# Patient Record
Sex: FEMALE | Race: WHITE | NOT HISPANIC OR LATINO | Employment: FULL TIME | URBAN - METROPOLITAN AREA
[De-identification: names, ages, dates, MRNs, and addresses within clinical notes are randomized per-mention and may not be internally consistent; named-entity substitution may affect disease eponyms.]

---

## 2018-01-04 ENCOUNTER — OFFICE VISIT (OUTPATIENT)
Dept: OBSTETRICS AND GYNECOLOGY | Facility: CLINIC | Age: 43
End: 2018-01-04
Payer: COMMERCIAL

## 2018-01-04 VITALS
BODY MASS INDEX: 31.01 KG/M2 | HEIGHT: 67 IN | SYSTOLIC BLOOD PRESSURE: 188 MMHG | DIASTOLIC BLOOD PRESSURE: 109 MMHG | HEART RATE: 94 BPM | WEIGHT: 197.56 LBS

## 2018-01-04 DIAGNOSIS — N63.21 BREAST LUMP ON LEFT SIDE AT 1 O'CLOCK POSITION: Primary | ICD-10-CM

## 2018-01-04 DIAGNOSIS — Z01.419 WELL WOMAN EXAM WITH ROUTINE GYNECOLOGICAL EXAM: ICD-10-CM

## 2018-01-04 PROCEDURE — 99386 PREV VISIT NEW AGE 40-64: CPT | Mod: S$GLB,,, | Performed by: OBSTETRICS & GYNECOLOGY

## 2018-01-04 PROCEDURE — 99999 PR PBB SHADOW E&M-NEW PATIENT-LVL III: CPT | Mod: PBBFAC,,, | Performed by: OBSTETRICS & GYNECOLOGY

## 2018-01-04 RX ORDER — MELOXICAM 15 MG/1
15 TABLET ORAL
COMMUNITY
Start: 2017-05-31

## 2018-01-04 NOTE — PROGRESS NOTES
CHIEF COMPLAINT   Gynecologic Exam  Chief Complaint   Patient presents with    Well Woman     annual        HISTORY OF PRESENT ILLNESS  Patient presents for annual exam. The patient has a c/o f 2-4wks of L upper breast 'dull, aggravating pain'. Is not able to feel a mass.   Appetite wt bm are all stable.     No LMP recorded. Patient has had a hysterectomy. benign indications.  Ovaries removed.  No ERT out of choice due to fam hx brca    Reports no bowel movement irregularities from baseline, bloating, or weight loss.  ERT:   None        Health Maintenance   Topic Date Due    Lipid Panel  1973    TETANUS VACCINE  10/07/1991    Mammogram  11/17/2016    Influenza Vaccine  08/01/2017       HISTORY  Patient Active Problem List   Diagnosis    Menopause present, declines hormone replacement therapy       Past Medical History:   Diagnosis Date    ADHD (attention deficit hyperactivity disorder)     Mental disorder     bipolar, depression       Past Surgical History:   Procedure Laterality Date    BACK SURGERY  02/28/2016    HYSTERECTOMY      OOPHORECTOMY         Family History   Problem Relation Age of Onset    Colon cancer Father     Breast cancer Maternal Aunt     Asthma Mother     Ovarian cancer Neg Hx        Social History     Social History    Marital status: Single     Spouse name: N/A    Number of children: N/A    Years of education: N/A     Social History Main Topics    Smoking status: Never Smoker    Smokeless tobacco: None    Alcohol use No    Drug use: No    Sexual activity: No     Other Topics Concern    None     Social History Narrative    None       Current Outpatient Prescriptions   Medication Sig Dispense Refill    alprazolam (XANAX) 1 MG tablet Take 1 mg by mouth 2 (two) times daily.      AMPHETAMINE SALT COMBO 20 MG tablet   0    lamotrigine (LAMICTAL) 100 MG tablet       meloxicam (MOBIC) 15 MG tablet Take 15 mg by mouth as needed.      vortioxetine 10 mg Tab Take 10 mg  by mouth.       No current facility-administered medications for this visit.        Review of patient's allergies indicates:   Allergen Reactions    Hydrocodone Rash    Codeine Rash         PHYSICAL EXAM     Vitals:    01/04/18 1026   BP: (!) 188/109   Pulse: 94   repeat bp was requested but nurse did not due to it due to miscommunication.  Spoke w pt just now - she is at work but for lunch will  Ck bp at a drug store and email back the value.   She has no c/o h/a vision change CP.    PAIN SCALE: 0/10 None    ROS:  GENERAL: No fever, chills, fatigability or weight loss.  ABDOMEN: Appetite fine. No weight loss. Denies diarrhea, abdominal pain, hematemesis or blood in stool.  No change in bowel movement pattern.  URINARY: No flank pain, dysuria or hematuria.  REPRODUCTIVE: No abnormal vaginal bleeding.  BREASTS: Breasts symmetric, nontender and no lumps detected.    PE:   APPEARANCE: Well nourished, well developed, in no acute distress.  NECK: Neck symmetric without masses or thyromegaly.    NODES: No inguinal lymph node enlargement.  ABDOMEN: Soft. No tenderness or masses. No hepatosplenomegaly. No hernias.  BREASTS: Symmetrical, no skin changes or visible lesions. No  nipple discharge or adenopathy bilaterally.  L breast at 1:00 where the pt feels her dull pain is an area of cystic thickening about 3x2cm that is tender, half way between the nipple and the axilla. It is mobile but poorly circumscribed.     PELVIC:   VULVA: No lesions. Normal female genitalia.  URETHRAL MEATUS: Normal size and location, no lesions, no prolapse.  URETHRA: No masses, tenderness, prolapse or scarring.  PELVIC: Normal external female genitalia without lesions. Normal hair distribution. Adequate perineal body, normal urethral meatus. Vagina moist and well rugated without lesions or discharge. No significant cystocele or rectocele. Uterus and cervix surgically absent. Bimanual exam revealed no masses, tenderness or abnormality.    ANUS,  PERINEUM: no masses, external hemorrhoids noted.      DIAGNOSIS:   1. Annual exam  2. Elevated bp  3. Breast mass    PLAN:    rec home bp cuff.; reviewed stroke signs and reasons to go to ER immed.   Mammogram diagnostic w u/s        MEDICATIONS PRESCRIBED: calcium vitamin D weight bearing exercise       COUNSELING:  Patient was counseled today on A.C.S. Pap guidelines and recommendations for yearly pelvic exams, mammograms and monthly self breast exams; to see her PCP for other health maintenance.        FOLLOW-UP: pt to email or call back bp repeat and set up f/u w PCP Rehana.        Dr. Lares,    After talking with you yesterday afternoon, I checked my blood pressure again.  It was still pretty high last night and again this morning so I went to see Dr. Fontaine .  When he checked, it was 140/90.   I did have the flu last week and was taking OTC medication and my anxiety has been higher than usual so Dr. Fontaine thinks it may be associated with that.   He also looked back thru my history and since my blood pressure has always stayed around 120/80, we're just going to monitor it for now.      I just wanted to let you know that I did follow up with him about it.     Kind regards,     Evelyn Morales

## 2018-01-05 ENCOUNTER — PATIENT MESSAGE (OUTPATIENT)
Dept: OBSTETRICS AND GYNECOLOGY | Facility: CLINIC | Age: 43
End: 2018-01-05

## 2018-01-08 ENCOUNTER — HOSPITAL ENCOUNTER (OUTPATIENT)
Dept: RADIOLOGY | Facility: HOSPITAL | Age: 43
Discharge: HOME | End: 2018-01-08
Attending: OBSTETRICS & GYNECOLOGY
Payer: COMMERCIAL

## 2018-01-08 VITALS — HEIGHT: 67 IN | WEIGHT: 197 LBS | BODY MASS INDEX: 30.92 KG/M2

## 2018-01-08 DIAGNOSIS — N63.21 BREAST LUMP ON LEFT SIDE AT 1 O'CLOCK POSITION: ICD-10-CM

## 2018-01-08 DIAGNOSIS — R92.8 ABNORMAL MAMMOGRAM: ICD-10-CM

## 2018-01-08 PROCEDURE — 77062 BREAST TOMOSYNTHESIS BI: CPT | Mod: 26,,, | Performed by: RADIOLOGY

## 2018-01-08 PROCEDURE — 76642 ULTRASOUND BREAST LIMITED: CPT | Mod: 26,LT,, | Performed by: RADIOLOGY

## 2018-01-08 PROCEDURE — 76642 ULTRASOUND BREAST LIMITED: CPT | Mod: TC,PO,LT

## 2018-01-08 PROCEDURE — 77062 BREAST TOMOSYNTHESIS BI: CPT | Mod: TC,PO

## 2018-01-08 PROCEDURE — 77066 DX MAMMO INCL CAD BI: CPT | Mod: 26,,, | Performed by: RADIOLOGY

## 2020-03-03 ENCOUNTER — PATIENT MESSAGE (OUTPATIENT)
Dept: OBSTETRICS AND GYNECOLOGY | Facility: CLINIC | Age: 45
End: 2020-03-03

## 2020-03-03 DIAGNOSIS — Z12.31 BREAST CANCER SCREENING BY MAMMOGRAM: Primary | ICD-10-CM

## 2021-05-06 ENCOUNTER — PATIENT MESSAGE (OUTPATIENT)
Dept: RESEARCH | Facility: HOSPITAL | Age: 46
End: 2021-05-06